# Patient Record
Sex: MALE | Race: WHITE | NOT HISPANIC OR LATINO | URBAN - METROPOLITAN AREA
[De-identification: names, ages, dates, MRNs, and addresses within clinical notes are randomized per-mention and may not be internally consistent; named-entity substitution may affect disease eponyms.]

---

## 2018-09-24 ENCOUNTER — TRANSCRIBE ORDERS (OUTPATIENT)
Dept: PHYSICAL THERAPY | Facility: HOSPITAL | Age: 23
End: 2018-09-24

## 2018-09-24 ENCOUNTER — HOSPITAL ENCOUNTER (OUTPATIENT)
Dept: PHYSICAL THERAPY | Facility: HOSPITAL | Age: 23
Setting detail: THERAPIES SERIES
Discharge: HOME | End: 2018-09-24

## 2018-09-24 DIAGNOSIS — G89.29 CHRONIC MIDLINE THORACIC BACK PAIN: Primary | ICD-10-CM

## 2018-09-24 DIAGNOSIS — M54.9 UPPER BACK PAIN: Primary | ICD-10-CM

## 2018-09-24 DIAGNOSIS — M54.6 CHRONIC MIDLINE THORACIC BACK PAIN: Primary | ICD-10-CM

## 2018-09-24 PROCEDURE — 97161 PT EVAL LOW COMPLEX 20 MIN: CPT | Performed by: PHYSICAL THERAPIST

## 2018-09-24 NOTE — THERAPY TREATMENT NOTE
Outpatient Physical Therapy Ortho Treatment Note  [facility]     Patient Name: Ruel Walsh  : 1995  MRN: [id number]  Today's Date: 2018      Visit Date: 2018    Visit Dx:    ICD-10-CM ICD-9-CM   1. Chronic midline thoracic back pain M54.6 724.1    G89.29 338.29       There is no problem list on file for this patient.       No past medical history on file.     No past surgical history on file.          PT Ortho     Row Name 18 1500       Posture/Observations    Posture/Observations Comments increased thoracic kyphosis, braod left thoracic curvature, L scapula higher than right, increased lumbar lordosis, level pelvis  -OBI       Quarter Clearing    Quarter Clearing --   No symptoms distal to shoulder.  -OBI       Special Tests/Palpation    Special Tests/Palpation --   thoracic spring WNL's  -OBI       General ROM    GENERAL ROM COMMENTS WNL AROM  -OBI       MMT (Manual Muscle Testing)    General MMT Comments Middle and lower trapeziou strength 3+/5 bilaterally.  Otherwise no gross strength deficits.  -OBI       Sensation    Sensation WNL? WNL  -OBI      User Key  (r) = Recorded By, (t) = Taken By, (c) = Cosigned By    Initials Name Provider Type    Dolores Daily, PT Physical Therapist                            PT Assessment/Plan     Row Name 18 1559          PT Assessment    Functional Limitations Performance in work activities;Limitations in functional capacity and performance  -OBI     Impairments Posture;Muscle strength;Pain;Endurance  -OBI     Assessment Comments Pt. presents with postural dysfunction and weakness of postural stability mm. including lower abdominals and interscapular mm.  He will benefit from PT intervention to address posture and core stability.   -OBI     Please refer to paper survey for additional self-reported information Yes  -OBI     Rehab Potential Good  -OBI     Patient/caregiver participated in establishment of treatment plan and goals Yes  -OBI      Patient would benefit from skilled therapy intervention Yes  -        PT Plan    PT Frequency Other (comment)  -     Predicted Duration of Therapy Intervention (Therapy Eval) one visit per 2 weeks for exercise education, development, and progression  -     Planned CPT's? PT EVAL LOW COMPLEXITY: 59902;PT THER PROC EA 15 MIN: 16034;PT MANUAL THERAPY EA 15 MIN: 07991;PT NEUROMUSC RE-EDUCATION EA 15 MIN: 29329  -     PT Plan Comments PT per POC.  -       User Key  (r) = Recorded By, (t) = Taken By, (c) = Cosigned By    Initials Name Provider Type    Dolores Daily PT Physical Therapist                                       PT OP Goals     Row Name 09/24/18 1500          PT Short Term Goals    STG Date to Achieve 10/22/18  -     STG 1 Pt. demonstrates independence in effective HEP.  -     STG 2 Pt. demonstrates improving awareness of sitting/work posture.  -     STG 3 Pt. reports decreasing frequency and intensity of symptoms.  -     STG 4 Middle and lower trapezius strength shows improvement over baseline.  -        Long Term Goals    LTG Date to Achieve 11/19/18  -OBI     LTG 1 Pt. is independent in advanced exercise program for ongoing improvement and symptom management.  -     LTG 2 Pt. understands importance of work positiong and stretch breaks to prevent/reduce symptoms.  -     LTG 3 Pain is not constant, is mild, and is responsive to exercise and postural correction.  -        Time Calculation    PT Goal Re-Cert Due Date 12/23/18  -       User Key  (r) = Recorded By, (t) = Taken By, (c) = Cosigned By    Initials Name Provider Type    Dolores Daily PT Physical Therapist          Therapy Education  Education Details: office ergonomics and backpack guidelines provided today  Given: HEP  Program: New  How Provided: Verbal, Demonstration, Written  Provided to: Patient  Level of Understanding: Verbalized, Demonstrated    Outcome Measure Options: Modifed Owestry  Modified  Oswestry  Modified Oswestry Score/Comments: 2/50=4%      Time Calculation:   Start Time: 1430  Therapy Suggested Charges     Code   Minutes Charges    None           Therapy Charges for Today     Code Description Service Date Service Provider Modifiers Qty    34799536765 HC PT EVAL LOW COMPLEXITY 3 9/24/2018 Dolores Salvador, PT GP 1          PT G-Codes  Outcome Measure Options: Modifed Owestry  Modified Oswestry Score/Comments: 2/50=4%         Dolores Salvador, PT  9/24/2018

## 2018-10-08 ENCOUNTER — HOSPITAL ENCOUNTER (OUTPATIENT)
Dept: PHYSICAL THERAPY | Facility: HOSPITAL | Age: 23
Setting detail: THERAPIES SERIES
Discharge: HOME | End: 2018-10-08

## 2018-10-08 DIAGNOSIS — G89.29 CHRONIC MIDLINE THORACIC BACK PAIN: Primary | ICD-10-CM

## 2018-10-08 DIAGNOSIS — M54.6 CHRONIC MIDLINE THORACIC BACK PAIN: Primary | ICD-10-CM

## 2018-10-08 PROCEDURE — 97110 THERAPEUTIC EXERCISES: CPT | Performed by: PHYSICAL THERAPIST

## 2018-10-08 NOTE — THERAPY TREATMENT NOTE
Outpatient Physical Therapy Ortho Treatment Note  [facility]     Patient Name: Ruel Walsh  : 1995  MRN: [id number]  Today's Date: 10/8/2018      Visit Date: 10/08/2018    Visit Dx:    ICD-10-CM ICD-9-CM   1. Chronic midline thoracic back pain M54.6 724.1    G89.29 338.29       There is no problem list on file for this patient.       No past medical history on file.     No past surgical history on file.          PT Ortho     Row Name 10/08/18 1600       Subjective Comments    Subjective Comments Pt. notices improvement in upper back discomfort and he thinks he is getting stronger.  He is interested in progressing exercises.  -OBI       Subjective Pain    Able to rate subjective pain? yes  -OBI    Pre-Treatment Pain Level 0  -OBI    Post-Treatment Pain Level 0  -OBI      User Key  (r) = Recorded By, (t) = Taken By, (c) = Cosigned By    Initials Name Provider Type    Dolores Daily, PT Physical Therapist                            PT Assessment/Plan     Row Name 10/08/18 1600          PT Assessment    Assessment Comments Pt. is compliant with HEP and motivated to progress.  -OBI        PT Plan    PT Plan Comments Follow up in 3 weeks.  Progress independent exercise as indicated at that time.  -OBI       User Key  (r) = Recorded By, (t) = Taken By, (c) = Cosigned By    Initials Name Provider Type    Dolores Daily, PT Physical Therapist                    Exercises     Row Name 10/08/18 1600             Subjective Comments    Subjective Comments Pt. notices improvement in upper back discomfort and he thinks he is getting stronger.  He is interested in progressing exercises.  -OBI         Subjective Pain    Able to rate subjective pain? yes  -OBI      Pre-Treatment Pain Level 0  -OBI      Post-Treatment Pain Level 0  -OBI         Total Minutes    26933 - PT Therapeutic Exercise Minutes 30  -OBI         Exercise 1    Exercise Name 1 Reviewed existing HEP and practiced new exercise in clinic today.   Additions to HEP included lower abdominal activation with LE lowering from 90/90 position, LTR with legs off of surface, scapular wall slides, supine diagonal flexion and horizontal abduction with blue theraband, pt. will add 1# weight to prone scapular exercises as tolerated.  -OBI      Time 1 30  -OBI        User Key  (r) = Recorded By, (t) = Taken By, (c) = Cosigned By    Initials Name Provider Type    Dolores Daily, PT Physical Therapist                                            Time Calculation:   Start Time: 1530  Total Timed Code Minutes- PT: 30 minute(s)  Therapy Suggested Charges     Code   Minutes Charges    78617 (CPT®) Hc Pt Neuromusc Re Education Ea 15 Min      28943 (CPT®) Hc Pt Ther Proc Ea 15 Min 30 2    99239 (CPT®) Hc Gait Training Ea 15 Min      28106 (CPT®) Hc Pt Therapeutic Act Ea 15 Min      31809 (CPT®) Hc Pt Manual Therapy Ea 15 Min      60977 (CPT®) Hc Pt Ther Massage- Per 15 Min      47990 (CPT®) Hc Pt Iontophoresis Ea 15 Min      68143 (CPT®) Hc Pt Elec Stim Ea-Per 15 Min      02562 (CPT®) Hc Pt Ultrasound Ea 15 Min      44377 (CPT®) Hc Pt Self Care/Mgmt/Train Ea 15 Min      70105 (CPT®) Hc Pt Prosthetic (S) Train Initial Encounter, Each 15 Min      88768 (CPT®) Hc Orthotic(S) Mgmt/Train Initial Encounter, Each 15min      96509 (CPT®) Hc Pt Aquatic Therapy Ea 15 Min      54760 (CPT®) Hc Pt Orthotic(S)/Prosthetic(S) Encounter, Each 15 Min       (CPT®) Hc Pt Electrical Stim Unattended      Total  30 2        Therapy Charges for Today     Code Description Service Date Service Provider Modifiers Qty    57524845247 HC PT THER PROC EA 15 MIN 10/8/2018 Dolores Salvador, PT GP 2                    Dolores Salvador PT  10/8/2018

## 2018-11-12 ENCOUNTER — HOSPITAL ENCOUNTER (OUTPATIENT)
Dept: PHYSICAL THERAPY | Facility: HOSPITAL | Age: 23
Setting detail: THERAPIES SERIES
Discharge: HOME | End: 2018-11-12

## 2018-11-12 DIAGNOSIS — M54.6 CHRONIC MIDLINE THORACIC BACK PAIN: Primary | ICD-10-CM

## 2018-11-12 DIAGNOSIS — G89.29 CHRONIC MIDLINE THORACIC BACK PAIN: Primary | ICD-10-CM

## 2018-11-12 PROCEDURE — 97110 THERAPEUTIC EXERCISES: CPT | Performed by: PHYSICAL THERAPIST

## 2018-11-12 NOTE — THERAPY DISCHARGE NOTE
Outpatient Physical Therapy Ortho Progress Note/Discharge Summary  [facility]     Patient Name: Ruel Walsh  : 1995  MRN: [id number]  Today's Date: 2018      Visit Date: 2018    Visit Dx:    ICD-10-CM ICD-9-CM   1. Chronic midline thoracic back pain M54.6 724.1    G89.29 338.29       There is no problem list on file for this patient.       No past medical history on file.     No past surgical history on file.    PT Ortho     Row Name 18 1600       Subjective Comments    Subjective Comments  Pt. has been very busy with work and school.  He says that when he does his exercises, he can tell that they are helpful.  He would like to learn some additional flexibility exercises to incorporate into his routine.  He is also exercising at the gym, and feels he is gaining strength in interscapular muscles.  -OBI       Subjective Pain    Able to rate subjective pain?  yes  -OBI    Pre-Treatment Pain Level  3  -OBI    Post-Treatment Pain Level  2  -      User Key  (r) = Recorded By, (t) = Taken By, (c) = Cosigned By    Initials Name Provider Type    Dolores Daily, PT Physical Therapist                      PT Assessment/Plan     Row Name 18 1600          PT Assessment    Assessment Comments  Pt. indicates understanding of existing and new exercises.  -        PT Plan    PT Plan Comments  DC to independent HEP and self-management.  -       User Key  (r) = Recorded By, (t) = Taken By, (c) = Cosigned By    Initials Name Provider Type    Dolores Daily, PT Physical Therapist              Exercises     Row Name 18 1600             Subjective Comments    Subjective Comments  Pt. has been very busy with work and school.  He says that when he does his exercises, he can tell that they are helpful.  He would like to learn some additional flexibility exercises to incorporate into his routine.  He is also exercising at the gym, and feels he is gaining strength in interscapular  muscles.  -OBI         Subjective Pain    Able to rate subjective pain?  yes  -OBI      Pre-Treatment Pain Level  3  -OBI      Post-Treatment Pain Level  2  -OBI         Total Minutes    72598 - PT Therapeutic Exercise Minutes  40  -OBI         Exercise 1    Exercise Name 1  Reassessment and additions made to HEP.    -OBI      Time 1  40  -OBI        User Key  (r) = Recorded By, (t) = Taken By, (c) = Cosigned By    Initials Name Provider Type    Dolores Daily PT Physical Therapist                         PT OP Goals     Row Name 11/12/18 1600          PT Short Term Goals    STG Date to Achieve  10/22/18  -OBI     STG 1  Pt. demonstrates independence in effective HEP.  -OBI     STG 1 Progress  Met  -OBI     STG 2  Pt. demonstrates improving awareness of sitting/work posture.  -OBI     STG 2 Progress  Met  -     STG 3  Pt. reports decreasing frequency and intensity of symptoms.  -OBI     STG 3 Progress  Progressing;Ongoing  -OBI     STG 4  Middle and lower trapezius strength shows improvement over baseline.  -     STG 4 Progress  Met  -        Long Term Goals    LTG Date to Achieve  11/19/18  -     LTG 1  Pt. is independent in advanced exercise program for ongoing improvement and symptom management.  -OBI     LTG 1 Progress  Met  -OBI     LTG 2  Pt. understands importance of work positiong and stretch breaks to prevent/reduce symptoms.  -     LTG 2 Progress  Met  -OBI     LTG 3  Pain is not constant, is mild, and is responsive to exercise and postural correction.  -     LTG 3 Progress  Met  -OBI       User Key  (r) = Recorded By, (t) = Taken By, (c) = Cosigned By    Initials Name Provider Type    Dolores Daily PT Physical Therapist                         Time Calculation:   Start Time: 1530  Total Timed Code Minutes- PT: 40 minute(s)  Therapy Suggested Charges     Code   Minutes Charges    58412 (CPT®) Hc Pt Neuromusc Re Education Ea 15 Min      40476 (CPT®) Hc Pt Ther Proc Ea 15 Min 40 3    71302 (CPT®)  Hc Gait Training Ea 15 Min      42617 (CPT®) Hc Pt Therapeutic Act Ea 15 Min      53058 (CPT®) Hc Pt Manual Therapy Ea 15 Min      41738 (CPT®) Hc Pt Ther Massage- Per 15 Min      80525 (CPT®) Hc Pt Iontophoresis Ea 15 Min      56214 (CPT®) Hc Pt Elec Stim Ea-Per 15 Min      14158 (CPT®) Hc Pt Ultrasound Ea 15 Min      27321 (CPT®) Hc Pt Self Care/Mgmt/Train Ea 15 Min      39108 (CPT®) Hc Pt Prosthetic (S) Train Initial Encounter, Each 15 Min      33362 (CPT®) Hc Orthotic(S) Mgmt/Train Initial Encounter, Each 15min      16436 (CPT®) Hc Pt Aquatic Therapy Ea 15 Min      01360 (CPT®) Hc Pt Orthotic(S)/Prosthetic(S) Encounter, Each 15 Min       (CPT®) Hc Pt Electrical Stim Unattended      Total  40 3        Therapy Charges for Today     Code Description Service Date Service Provider Modifiers Qty    20150917682 HC PT THER PROC EA 15 MIN 11/12/2018 Dolores Salvador, PT GP 3                OP PT Discharge Summary  Date of Discharge: 11/12/18  Reason for Discharge: Independent  Outcomes Achieved: Patient able to partially acheive established goals  Discharge Destination: Home with home program      Dolores Salvador, PT  11/12/2018